# Patient Record
Sex: FEMALE | Race: WHITE | Employment: FULL TIME | URBAN - METROPOLITAN AREA
[De-identification: names, ages, dates, MRNs, and addresses within clinical notes are randomized per-mention and may not be internally consistent; named-entity substitution may affect disease eponyms.]

---

## 2018-02-28 PROCEDURE — 81001 URINALYSIS AUTO W/SCOPE: CPT | Performed by: FAMILY MEDICINE

## 2018-05-02 PROCEDURE — 81001 URINALYSIS AUTO W/SCOPE: CPT | Performed by: FAMILY MEDICINE

## 2018-06-19 PROCEDURE — 87046 STOOL CULTR AEROBIC BACT EA: CPT | Performed by: FAMILY MEDICINE

## 2018-06-19 PROCEDURE — 87272 CRYPTOSPORIDIUM AG IF: CPT | Performed by: FAMILY MEDICINE

## 2018-06-19 PROCEDURE — 87427 SHIGA-LIKE TOXIN AG IA: CPT | Performed by: FAMILY MEDICINE

## 2018-06-19 PROCEDURE — 89055 LEUKOCYTE ASSESSMENT FECAL: CPT | Performed by: FAMILY MEDICINE

## 2018-06-19 PROCEDURE — 87329 GIARDIA AG IA: CPT | Performed by: FAMILY MEDICINE

## 2018-06-19 PROCEDURE — 87045 FECES CULTURE AEROBIC BACT: CPT | Performed by: FAMILY MEDICINE

## 2019-01-02 PROBLEM — I10 BENIGN ESSENTIAL HTN: Status: ACTIVE | Noted: 2019-01-02

## 2020-12-15 PROBLEM — M25.562 CHRONIC PAIN OF LEFT KNEE: Status: ACTIVE | Noted: 2020-12-15

## 2020-12-15 PROBLEM — G89.29 CHRONIC PAIN OF LEFT KNEE: Status: ACTIVE | Noted: 2020-12-15

## 2021-05-01 PROBLEM — M81.0 AGE-RELATED OSTEOPOROSIS WITHOUT CURRENT PATHOLOGICAL FRACTURE: Status: ACTIVE | Noted: 2021-05-01

## 2021-05-01 PROBLEM — K76.0 FATTY LIVER: Status: ACTIVE | Noted: 2021-05-01

## 2021-05-01 PROBLEM — E04.1 THYROID NODULE: Status: ACTIVE | Noted: 2021-05-01

## 2021-08-27 RX ORDER — MULTIVIT-MIN/IRON/FOLIC ACID/K 18-600-40
CAPSULE ORAL DAILY
COMMUNITY
End: 2021-11-30

## 2021-08-27 RX ORDER — ACETAMINOPHEN 500 MG
1000 TABLET ORAL ONCE
Status: CANCELLED | OUTPATIENT
Start: 2021-08-27 | End: 2021-08-27

## 2021-09-13 PROBLEM — E66.01 OBESITY, MORBID (HCC): Status: ACTIVE | Noted: 2021-09-13

## 2021-09-13 PROBLEM — E21.3 HYPERPARATHYROIDISM (HCC): Status: ACTIVE | Noted: 2021-09-13

## 2021-09-25 ENCOUNTER — LAB ENCOUNTER (OUTPATIENT)
Dept: LAB | Age: 66
End: 2021-09-25
Attending: SURGERY
Payer: MEDICARE

## 2021-09-25 DIAGNOSIS — E21.3 HYPERPARATHYROIDISM (HCC): ICD-10-CM

## 2021-09-27 LAB — SARS-COV-2 RNA RESP QL NAA+PROBE: NOT DETECTED

## 2021-09-28 ENCOUNTER — HOSPITAL ENCOUNTER (OUTPATIENT)
Facility: HOSPITAL | Age: 66
Setting detail: HOSPITAL OUTPATIENT SURGERY
Discharge: HOME | End: 2021-09-28
Attending: SURGERY | Admitting: SURGERY
Payer: MEDICARE

## 2021-09-28 ENCOUNTER — ANESTHESIA EVENT (OUTPATIENT)
Dept: SURGERY | Facility: HOSPITAL | Age: 66
End: 2021-09-28
Payer: MEDICARE

## 2021-09-28 ENCOUNTER — ANESTHESIA (OUTPATIENT)
Dept: SURGERY | Facility: HOSPITAL | Age: 66
End: 2021-09-28
Payer: MEDICARE

## 2021-09-28 VITALS
BODY MASS INDEX: 42.61 KG/M2 | DIASTOLIC BLOOD PRESSURE: 85 MMHG | OXYGEN SATURATION: 97 % | HEIGHT: 65 IN | RESPIRATION RATE: 16 BRPM | HEART RATE: 78 BPM | WEIGHT: 255.75 LBS | TEMPERATURE: 98 F | SYSTOLIC BLOOD PRESSURE: 158 MMHG

## 2021-09-28 DIAGNOSIS — E21.3 HYPERPARATHYROIDISM (HCC): Primary | ICD-10-CM

## 2021-09-28 PROCEDURE — 83970 ASSAY OF PARATHORMONE: CPT | Performed by: SURGERY

## 2021-09-28 PROCEDURE — 88331 PATH CONSLTJ SURG 1 BLK 1SPC: CPT | Performed by: SURGERY

## 2021-09-28 PROCEDURE — 88305 TISSUE EXAM BY PATHOLOGIST: CPT | Performed by: SURGERY

## 2021-09-28 PROCEDURE — 0GBN0ZZ EXCISION OF RIGHT INFERIOR PARATHYROID GLAND, OPEN APPROACH: ICD-10-PCS | Performed by: SURGERY

## 2021-09-28 PROCEDURE — 0GBM0ZZ EXCISION OF LEFT SUPERIOR PARATHYROID GLAND, OPEN APPROACH: ICD-10-PCS | Performed by: SURGERY

## 2021-09-28 RX ORDER — ONDANSETRON 2 MG/ML
INJECTION INTRAMUSCULAR; INTRAVENOUS AS NEEDED
Status: DISCONTINUED | OUTPATIENT
Start: 2021-09-28 | End: 2021-09-28 | Stop reason: SURG

## 2021-09-28 RX ORDER — DEXAMETHASONE SODIUM PHOSPHATE 4 MG/ML
VIAL (ML) INJECTION AS NEEDED
Status: DISCONTINUED | OUTPATIENT
Start: 2021-09-28 | End: 2021-09-28 | Stop reason: SURG

## 2021-09-28 RX ORDER — SODIUM CHLORIDE, SODIUM LACTATE, POTASSIUM CHLORIDE, CALCIUM CHLORIDE 600; 310; 30; 20 MG/100ML; MG/100ML; MG/100ML; MG/100ML
INJECTION, SOLUTION INTRAVENOUS CONTINUOUS
Status: DISCONTINUED | OUTPATIENT
Start: 2021-09-28 | End: 2021-09-28

## 2021-09-28 RX ORDER — HYDROCODONE BITARTRATE AND ACETAMINOPHEN 5; 325 MG/1; MG/1
1 TABLET ORAL AS NEEDED
Status: DISCONTINUED | OUTPATIENT
Start: 2021-09-28 | End: 2021-09-28

## 2021-09-28 RX ORDER — BUPIVACAINE HYDROCHLORIDE 5 MG/ML
INJECTION, SOLUTION EPIDURAL; INTRACAUDAL AS NEEDED
Status: DISCONTINUED | OUTPATIENT
Start: 2021-09-28 | End: 2021-09-28 | Stop reason: HOSPADM

## 2021-09-28 RX ORDER — ACETAMINOPHEN 500 MG
1000 TABLET ORAL EVERY 6 HOURS PRN
COMMUNITY
End: 2021-11-30 | Stop reason: ALTCHOICE

## 2021-09-28 RX ORDER — ROCURONIUM BROMIDE 10 MG/ML
INJECTION, SOLUTION INTRAVENOUS AS NEEDED
Status: DISCONTINUED | OUTPATIENT
Start: 2021-09-28 | End: 2021-09-28 | Stop reason: SURG

## 2021-09-28 RX ORDER — NALOXONE HYDROCHLORIDE 0.4 MG/ML
80 INJECTION, SOLUTION INTRAMUSCULAR; INTRAVENOUS; SUBCUTANEOUS AS NEEDED
Status: DISCONTINUED | OUTPATIENT
Start: 2021-09-28 | End: 2021-09-28

## 2021-09-28 RX ORDER — HYDROMORPHONE HYDROCHLORIDE 1 MG/ML
0.4 INJECTION, SOLUTION INTRAMUSCULAR; INTRAVENOUS; SUBCUTANEOUS EVERY 5 MIN PRN
Status: DISCONTINUED | OUTPATIENT
Start: 2021-09-28 | End: 2021-09-28

## 2021-09-28 RX ORDER — ACETAMINOPHEN 325 MG/1
325 TABLET ORAL ONCE AS NEEDED
Status: COMPLETED | OUTPATIENT
Start: 2021-09-28 | End: 2021-09-28

## 2021-09-28 RX ORDER — DIPHENHYDRAMINE HYDROCHLORIDE 50 MG/ML
12.5 INJECTION INTRAMUSCULAR; INTRAVENOUS AS NEEDED
Status: DISCONTINUED | OUTPATIENT
Start: 2021-09-28 | End: 2021-09-28

## 2021-09-28 RX ORDER — ACETAMINOPHEN 325 MG/1
TABLET ORAL
Status: COMPLETED
Start: 2021-09-28 | End: 2021-09-28

## 2021-09-28 RX ORDER — MIDAZOLAM HYDROCHLORIDE 1 MG/ML
1 INJECTION INTRAMUSCULAR; INTRAVENOUS EVERY 5 MIN PRN
Status: DISCONTINUED | OUTPATIENT
Start: 2021-09-28 | End: 2021-09-28

## 2021-09-28 RX ORDER — ONDANSETRON 2 MG/ML
2 INJECTION INTRAMUSCULAR; INTRAVENOUS AS NEEDED
Status: DISCONTINUED | OUTPATIENT
Start: 2021-09-28 | End: 2021-09-28

## 2021-09-28 RX ORDER — HYDROCODONE BITARTRATE AND ACETAMINOPHEN 5; 325 MG/1; MG/1
2 TABLET ORAL AS NEEDED
Status: DISCONTINUED | OUTPATIENT
Start: 2021-09-28 | End: 2021-09-28

## 2021-09-28 RX ORDER — MIDAZOLAM HYDROCHLORIDE 1 MG/ML
INJECTION INTRAMUSCULAR; INTRAVENOUS AS NEEDED
Status: DISCONTINUED | OUTPATIENT
Start: 2021-09-28 | End: 2021-09-28 | Stop reason: SURG

## 2021-09-28 RX ADMIN — ROCURONIUM BROMIDE 70 MG: 10 INJECTION, SOLUTION INTRAVENOUS at 13:25:00

## 2021-09-28 RX ADMIN — DEXAMETHASONE SODIUM PHOSPHATE 4 MG: 4 MG/ML VIAL (ML) INJECTION at 13:40:00

## 2021-09-28 RX ADMIN — ONDANSETRON 2 MG: 2 INJECTION INTRAMUSCULAR; INTRAVENOUS at 13:52:00

## 2021-09-28 RX ADMIN — MIDAZOLAM HYDROCHLORIDE 2 MG: 1 INJECTION INTRAMUSCULAR; INTRAVENOUS at 13:18:00

## 2021-09-28 RX ADMIN — SODIUM CHLORIDE, SODIUM LACTATE, POTASSIUM CHLORIDE, CALCIUM CHLORIDE: 600; 310; 30; 20 INJECTION, SOLUTION INTRAVENOUS at 15:49:00

## 2021-09-28 NOTE — ANESTHESIA POSTPROCEDURE EVALUATION
[address] [place] [address] Patient Status:  Hospital Outpatient Surgery   Age/Gender 74year old female MRN [id number]   Location [facility] Attending Isaak Donohue MD   Hosp Day # 0 PCP Lillian Carter MD

## 2021-09-28 NOTE — H&P
[facility]  Report of history and physical    Carlita Jernigan Patient Status:  Hospital Outpatient Surgery    1955 MRN [id number]   Location [facility] Attending Cade Baker MD   Hosp Day # 0 PCP Logan Sanchez MD Location: [address] [place] [address]   • EYE SURGERY     • HYSTERECTOMY      TVH, OVARIES LEFT IN   •       TWO   • OTHER SURGICAL HISTORY      DENTAL   • OTHER SURGICAL HISTORY  13  [facility] Flonnie Cables)    EGD (dysphagia, epigastric pain): normal except sm Comment:Liver shut down.              Cortisone Acetate POWD: LIVER FAILURE/SHINGLES  Dilaudid [[employer]*    ANAPHYLAXIS  Bentyl                  PAIN    Comment:ABDOMINAL PAIN  Ciprofloxacin           PALPITATIONS  Crestor [Rosuvastat*    MYALGIA    Comme gland     Lab:  Component      Latest Ref Rng & Units 5/10/2021 5/26/2020 9/3/2019   CALCIUM      8.6 - 10.3 mg/dL 9.8 10.1 (H) 9.6      Component      Latest Ref Rng & Units 11/27/2018 9/24/2018 5/2/2018 2/28/2018   CALCIUM      8.6 - 10.3 mg/dL 10.4 (H) patients as:   Normal (T-score at or above -1.0),   Osteopenia (T-score between -1.0 and -2.5), or   Osteoporosis (T-score at or below -2.5).    10-year Fracture Risk:   -----------------------------------------------------------------       Impression and

## 2021-09-28 NOTE — BRIEF OP NOTE
Pre-Operative Diagnosis: Hyperparathyroidism ([facility] [address] [E21.3]     Post-Operative Diagnosis: Hyperparathyroidism ([facility] [address] [E21.3]      Procedure Performed:   PARATHYROIDECTOMY WITH INTRAOPERATIVE ULTRASOUND, INTACT PARATHORMONE ASSAY,    Surgeon(s) and Role:

## 2021-09-28 NOTE — ANESTHESIA PREPROCEDURE EVALUATION
PRE-OP EVALUATION    Patient Name: Conchita Lady    Admit Diagnosis: Hyperparathyroidism ([facility] [address] [E21.3]    Pre-op Diagnosis: Hyperparathyroidism ([facility] [address] [E21.3]    PARATHYROIDECTOMY WITH INTRAOPERATIVE ULTRASOUND, INTACT PARATHORMONE ASSAY, NERVE MONITORIN mg total) by mouth daily. , Disp: 90 capsule, Rfl: 1, 9/28/2021 at 0600  Pimecrolimus (ELIDEL) 1 % External Cream, Apply to AA 1-2 times daily as needed. , Disp: 60 g, Rfl: 2, 9/28/2021 at Unknown time  alendronate (FOSAMAX) 70 MG Oral Tab, Take 1 tablet ([employer]   • EYE SURGERY     • HYSTERECTOMY      TVH, OVARIES LEFT IN   •       TWO   • OTHER SURGICAL HISTORY      DENTAL   • OTHER SURGICAL HISTORY  13  ALMITA Lenz)    EGD (dysphagia, epigastric pain): normal except small gastric polyp (fundic gland

## 2021-09-28 NOTE — ANESTHESIA PROCEDURE NOTES
Airway  Date/Time: 9/28/2021 1:27 PM  Urgency: elective      General Information and Staff    Patient location during procedure: OR  Anesthesiologist: Edna Coyle MD  Performed: anesthesiologist     Indications and Patient Condition  Indications for airw

## 2021-09-29 NOTE — OPERATIVE REPORT
[address]    PATIENT'S NAME: Billee Form   ATTENDING PHYSICIAN: Kevin Morris M.D. OPERATING PHYSICIAN: Kevin Morris M.D.    PATIENT ACCOUNT#:   [de-identified]    LOCATION:  [address] [place] [address] 2 EDWP 10  MEDICAL RECORD #:   [id number]       DATE Marzetta Nancy operating table. Lower extremity SCD boots were placed. After IV sedation and endotracheal intubation, the patient's arms were tucked at side, a roll was placed under shoulder blade, and neck was slightly hyperextended. Ultrasound was done.   Again, I wa the deep cervical dermal layer. Local anesthetic was injected into the surrounding tissue and a 4-0 Monocryl for the skin placing a subcuticular stitch. Incision was then covered with Steri-Strips, 4 x 4, and tape.   Patient tolerated the procedure well,

## 2021-10-28 PROBLEM — E66.01 MORBID OBESITY WITH BMI OF 40.0-44.9, ADULT (HCC): Status: ACTIVE | Noted: 2021-09-13

## 2021-10-28 PROBLEM — K76.0 FATTY LIVER DISEASE, NONALCOHOLIC: Status: ACTIVE | Noted: 2021-05-01

## 2021-11-30 PROBLEM — R20.2 PARESTHESIAS: Status: ACTIVE | Noted: 2021-11-30

## (undated) DEVICE — ABSORBABLE HEMOSTAT (OXIDIZED REGENERATED CELLULOSE, U.S.P.): Brand: SURGICEL

## (undated) DEVICE — THYROID: Brand: MEDLINE INDUSTRIES, INC.

## (undated) DEVICE — 3M™ STERI-STRIP™ REINFORCED ADHESIVE SKIN CLOSURES, R1547, 1/2 IN X 4 IN (12 MM X 100 MM), 6 STRIPS/ENVELOPE: Brand: 3M™ STERI-STRIP™

## (undated) DEVICE — SPONGE: SPECIALTY PEANUT XR 100/CS: Brand: MEDICAL ACTION INDUSTRIES

## (undated) DEVICE — SOL  .9 1000ML BTL

## (undated) DEVICE — SUTURE MONOCRYL 4-0 PS-2

## (undated) DEVICE — STERILE SYNTHETIC POLYISOPRENE POWDER-FREE SURGICAL GLOVES WITH HYDROGEL COATING: Brand: PROTEXIS

## (undated) DEVICE — SUTURE VICRYL 3-0 SH

## (undated) DEVICE — SCD SLEEVE KNEE HI BLEND

## (undated) DEVICE — SUTURE SILK 3-0

## (undated) DEVICE — 3M(TM) MICROPORE TAPE DISPENSER 1535-2: Brand: 3M™ MICROPORE™

## (undated) NOTE — LETTER
OUTSIDE TESTING RESULT REQUEST     IMPORTANT: FOR YOUR IMMEDIATE ATTENTION  Please FAX all test results listed below to: [phone]     * * * * If testing is NOT complete, arrange with patient A.MARLINE. * * * *      Patient Name: Latjaswant Mungo  Surgery